# Patient Record
Sex: MALE | Race: WHITE | URBAN - METROPOLITAN AREA
[De-identification: names, ages, dates, MRNs, and addresses within clinical notes are randomized per-mention and may not be internally consistent; named-entity substitution may affect disease eponyms.]

---

## 2018-06-22 ENCOUNTER — OFFICE VISIT (OUTPATIENT)
Dept: URBAN - METROPOLITAN AREA CLINIC 18 | Facility: CLINIC | Age: 83
End: 2018-06-22
Payer: MEDICARE

## 2018-06-22 DIAGNOSIS — H40.1131 PRIMARY OPEN-ANGLE GLAUCOMA, BILATERAL, MILD STAGE: Primary | ICD-10-CM

## 2018-06-22 PROCEDURE — 92133 CPTRZD OPH DX IMG PST SGM ON: CPT | Performed by: OPTOMETRIST

## 2018-06-22 PROCEDURE — 99214 OFFICE O/P EST MOD 30 MIN: CPT | Performed by: OPTOMETRIST

## 2018-06-22 PROCEDURE — 92083 EXTENDED VISUAL FIELD XM: CPT | Performed by: OPTOMETRIST

## 2018-06-22 ASSESSMENT — INTRAOCULAR PRESSURE
OS: 12
OD: 12

## 2018-06-22 ASSESSMENT — KERATOMETRY
OS: 44.38
OD: 45.38

## 2018-06-22 NOTE — IMPRESSION/PLAN
Impression: Diagnosis: Primary open-angle glaucoma, bilateral, mild stage. Code: A25.6916. IOP stable OU Plan: Continue Travatan Z QHS OU daily OCT-81/76(83/79)(74/78)(83/74), VF OU borderline

## 2018-06-22 NOTE — IMPRESSION/PLAN
Impression: Diagnosis: Age-related nuclear cataract, bilateral. Code: H25.13. Plan: Cataracts account for the patient's complaints. No treatment currently recommended. The patient will monitor vision changes and contact us with any decrease in vision.

## 2018-07-20 ENCOUNTER — OFFICE VISIT (OUTPATIENT)
Dept: URBAN - METROPOLITAN AREA CLINIC 18 | Facility: CLINIC | Age: 83
End: 2018-07-20
Payer: COMMERCIAL

## 2018-07-20 PROCEDURE — 92012 INTRM OPH EXAM EST PATIENT: CPT | Performed by: OPTOMETRIST

## 2018-07-20 PROCEDURE — 92015 DETERMINE REFRACTIVE STATE: CPT | Performed by: OPTOMETRIST

## 2018-07-20 ASSESSMENT — VISUAL ACUITY
OS: 20/60
OD: 20/25

## 2018-07-20 ASSESSMENT — KERATOMETRY
OD: 44.25
OS: 46.25

## 2018-10-05 ENCOUNTER — OFFICE VISIT (OUTPATIENT)
Dept: URBAN - METROPOLITAN AREA CLINIC 18 | Facility: CLINIC | Age: 83
End: 2018-10-05
Payer: MEDICARE

## 2018-10-05 PROCEDURE — 99213 OFFICE O/P EST LOW 20 MIN: CPT | Performed by: OPTOMETRIST

## 2018-10-05 ASSESSMENT — INTRAOCULAR PRESSURE
OD: 15
OS: 11

## 2018-10-05 NOTE — IMPRESSION/PLAN
Impression: Central corneal opacity, left eye: H17.12. Plan: Discussed diagnosis in detail with patient. Advised patient of condition. Pt to cont P.A QID x 1 week then D/C. Cont using ATs daily at least QID OU.

## 2018-10-05 NOTE — IMPRESSION/PLAN
Impression: Diagnosis: Primary open-angle glaucoma, bilateral, mild stage. Code: W82.3247. stable IOP OU Plan: Continue Travatan Z QHS OU daily OCT-81/76(83/79)(74/78)(83/74), VF OU borderline

## 2019-01-28 ENCOUNTER — OFFICE VISIT (OUTPATIENT)
Dept: URBAN - METROPOLITAN AREA CLINIC 18 | Facility: CLINIC | Age: 84
End: 2019-01-28
Payer: MEDICARE

## 2019-01-28 PROCEDURE — 99213 OFFICE O/P EST LOW 20 MIN: CPT | Performed by: OPTOMETRIST

## 2019-01-28 ASSESSMENT — INTRAOCULAR PRESSURE
OS: 12
OD: 16

## 2019-01-28 ASSESSMENT — KERATOMETRY: OD: 44.38

## 2019-01-28 NOTE — IMPRESSION/PLAN
Impression: Diagnosis: Primary open-angle glaucoma, bilateral, mild stage. Code: F74.6297. IOP stable OU Plan: Continue Travatan Z QHS OU daily OCT-81/76(83/79)(74/78)(83/74), VF OU borderline

## 2019-05-06 ENCOUNTER — OFFICE VISIT (OUTPATIENT)
Dept: URBAN - METROPOLITAN AREA CLINIC 18 | Facility: CLINIC | Age: 84
End: 2019-05-06
Payer: MEDICARE

## 2019-05-06 DIAGNOSIS — H25.13 AGE-RELATED NUCLEAR CATARACT, BILATERAL: ICD-10-CM

## 2019-05-06 DIAGNOSIS — H17.12 CENTRAL CORNEAL OPACITY, LEFT EYE: ICD-10-CM

## 2019-05-06 PROCEDURE — 99213 OFFICE O/P EST LOW 20 MIN: CPT | Performed by: OPTOMETRIST

## 2019-05-06 ASSESSMENT — INTRAOCULAR PRESSURE
OS: 12
OD: 12

## 2019-05-06 NOTE — IMPRESSION/PLAN
Impression: Diagnosis: Primary open-angle glaucoma, bilateral, mild stage. Code: A35.0092.  stable IOP OU Plan: Continue Travatan Z QHS OU daily OCT-81/76(83/79)(74/78)(83/74), VF OU borderline

## 2019-07-23 ENCOUNTER — OFFICE VISIT (OUTPATIENT)
Dept: URBAN - METROPOLITAN AREA CLINIC 18 | Facility: CLINIC | Age: 84
End: 2019-07-23
Payer: MEDICARE

## 2019-07-23 PROCEDURE — 92012 INTRM OPH EXAM EST PATIENT: CPT | Performed by: OPTOMETRIST

## 2019-07-23 RX ORDER — TRIFLURIDINE 1 G/100ML
1 % SOLUTION OPHTHALMIC
Qty: 1 | Refills: 0 | Status: INACTIVE
Start: 2019-07-23 | End: 2019-08-01

## 2019-07-23 ASSESSMENT — INTRAOCULAR PRESSURE
OS: 12
OD: 14

## 2019-07-23 NOTE — IMPRESSION/PLAN
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 Plan: Discussed diagnosis in detail with patient. Advised patient of condition. Discussed treatment options with patient. Recommended to start Viroptic 1% (trifluridine 1%) drops 9x's a day into OS. Okay to use AT's in-between.

## 2019-07-29 ENCOUNTER — OFFICE VISIT (OUTPATIENT)
Dept: URBAN - METROPOLITAN AREA CLINIC 18 | Facility: CLINIC | Age: 84
End: 2019-07-29
Payer: MEDICARE

## 2019-07-29 PROCEDURE — 92012 INTRM OPH EXAM EST PATIENT: CPT | Performed by: OPTOMETRIST

## 2019-07-29 NOTE — IMPRESSION/PLAN
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 Plan: Continue Viroptic 1% (trifluridine 1%) drops 9x's a day into OS. Okay to use AT's in-between.

## 2019-08-05 ENCOUNTER — OFFICE VISIT (OUTPATIENT)
Dept: URBAN - METROPOLITAN AREA CLINIC 18 | Facility: CLINIC | Age: 84
End: 2019-08-05
Payer: MEDICARE

## 2019-08-05 DIAGNOSIS — B00.52 HERPESVIRAL KERATITIS: Primary | ICD-10-CM

## 2019-08-05 PROCEDURE — 99213 OFFICE O/P EST LOW 20 MIN: CPT | Performed by: OPTOMETRIST

## 2019-08-05 RX ORDER — PREDNISOLONE ACETATE 10 MG/ML
1 % SUSPENSION/ DROPS OPHTHALMIC
Qty: 1 | Refills: 0 | Status: INACTIVE
Start: 2019-08-05 | End: 2019-08-26

## 2019-08-05 RX ORDER — TRIFLURIDINE 1 G/100ML
1 % SOLUTION OPHTHALMIC
Qty: 7.5 | Refills: 1 | Status: INACTIVE
Start: 2019-08-05 | End: 2019-08-26

## 2019-08-05 ASSESSMENT — KERATOMETRY: OD: 44.75

## 2019-08-05 NOTE — IMPRESSION/PLAN
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 Plan: Discussed diagnosis with pt. Used Q tip and proparacaine to clean area. Continue Viroptic 1% (trifluridine 1%) drops 9x's a day into OS and start Pred ace BID OS (Rx sent). Okay to use AT's in-between.

## 2019-08-12 ENCOUNTER — OFFICE VISIT (OUTPATIENT)
Dept: URBAN - METROPOLITAN AREA CLINIC 18 | Facility: CLINIC | Age: 84
End: 2019-08-12
Payer: MEDICARE

## 2019-08-12 PROCEDURE — 92012 INTRM OPH EXAM EST PATIENT: CPT | Performed by: OPTOMETRIST

## 2019-08-12 ASSESSMENT — INTRAOCULAR PRESSURE
OS: 12
OD: 11

## 2019-08-12 NOTE — IMPRESSION/PLAN
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 Improved Plan: Reassured patient that light sensitivity is normal and will improve once condition resolves. Discontinue Prednisolone Acetate 1% at this time and Viroptic 1% (trifluridine 1%) drops 9x's a day into OS. Increase dosage of artifical tears into left eye. Okay to continue Travatan QHS OU. If condition worsens, patient is advised to call office.

## 2019-08-26 ENCOUNTER — OFFICE VISIT (OUTPATIENT)
Dept: URBAN - METROPOLITAN AREA CLINIC 18 | Facility: CLINIC | Age: 84
End: 2019-08-26
Payer: MEDICARE

## 2019-08-26 PROCEDURE — 92012 INTRM OPH EXAM EST PATIENT: CPT | Performed by: OPTOMETRIST

## 2019-08-26 RX ORDER — TRIFLURIDINE 1 G/100ML
1 % SOLUTION OPHTHALMIC
Qty: 1 | Refills: 1 | Status: INACTIVE
Start: 2019-08-26 | End: 2020-09-14

## 2019-08-26 ASSESSMENT — KERATOMETRY: OD: 44.25

## 2019-08-26 NOTE — IMPRESSION/PLAN
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 Improved but still can see faint ulcer Plan: Restart Viroptic 1% (trifluridine 1%) drops 5x's a day into OS. Use artifical tears in left eye. Okay to continue Travatan QHS OU. If condition worsens, patient is advised to call office.

## 2019-09-09 ENCOUNTER — OFFICE VISIT (OUTPATIENT)
Dept: URBAN - METROPOLITAN AREA CLINIC 18 | Facility: CLINIC | Age: 84
End: 2019-09-09
Payer: MEDICARE

## 2019-09-09 PROCEDURE — 92012 INTRM OPH EXAM EST PATIENT: CPT | Performed by: OPTOMETRIST

## 2019-09-09 ASSESSMENT — INTRAOCULAR PRESSURE: OS: 11

## 2019-09-09 NOTE — IMPRESSION/PLAN
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 Improved Plan: Continue Viroptic 1% (trifluridine 1%) drops 3x's a day into OS for 1 week then stop. Use artifical tears in left eye. Okay to continue Travatan QHS OU. If condition worsens, patient is advised to call office.

## 2019-09-23 ENCOUNTER — OFFICE VISIT (OUTPATIENT)
Dept: URBAN - METROPOLITAN AREA CLINIC 18 | Facility: CLINIC | Age: 84
End: 2019-09-23
Payer: MEDICARE

## 2019-09-23 PROCEDURE — 92012 INTRM OPH EXAM EST PATIENT: CPT | Performed by: OPTOMETRIST

## 2019-09-23 ASSESSMENT — KERATOMETRY: OD: 44.50

## 2019-09-23 NOTE — IMPRESSION/PLAN
Impression: Diagnosis: Primary open-angle glaucoma, bilateral, mild stage. Code: M06.6247. IOP stable OU Plan: Continue Travatan Z QHS OU daily OCT-81/76(83/79)(74/78)(83/74), VF OU borderline

## 2019-09-23 NOTE — IMPRESSION/PLAN
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 Improved Plan: Stop Viroptic 1% (trifluridine 1%) drops. Use artifical tears in left eye. Okay to continue Travatan QHS OU. If condition worsens, patient is advised to call office.

## 2019-12-16 ENCOUNTER — OFFICE VISIT (OUTPATIENT)
Dept: URBAN - METROPOLITAN AREA CLINIC 18 | Facility: CLINIC | Age: 84
End: 2019-12-16
Payer: MEDICARE

## 2019-12-16 PROCEDURE — 92133 CPTRZD OPH DX IMG PST SGM ON: CPT | Performed by: OPTOMETRIST

## 2019-12-16 PROCEDURE — 92083 EXTENDED VISUAL FIELD XM: CPT | Performed by: OPTOMETRIST

## 2019-12-16 PROCEDURE — 99214 OFFICE O/P EST MOD 30 MIN: CPT | Performed by: OPTOMETRIST

## 2019-12-16 ASSESSMENT — INTRAOCULAR PRESSURE
OS: 11
OS: 14
OD: 13
OD: 14

## 2019-12-16 NOTE — IMPRESSION/PLAN
Impression: Diagnosis: Primary open-angle glaucoma, bilateral, mild stage. Code: T22.9650. stable IOP OU Plan: Continue Travatan Z QHS OU daily OCT-77/63?(81/76)(83/79)(74/78)(83/74), VF OD sup arc misses OS sup arc misses BJW

## 2020-03-03 ENCOUNTER — OFFICE VISIT (OUTPATIENT)
Dept: URBAN - METROPOLITAN AREA CLINIC 18 | Facility: CLINIC | Age: 85
End: 2020-03-03
Payer: MEDICARE

## 2020-03-03 PROCEDURE — 99213 OFFICE O/P EST LOW 20 MIN: CPT | Performed by: OPTOMETRIST

## 2020-03-03 ASSESSMENT — INTRAOCULAR PRESSURE
OS: 12
OD: 12

## 2020-03-03 NOTE — IMPRESSION/PLAN
Impression: Diagnosis: Primary open-angle glaucoma, bilateral, mild stage. Code: Y65.2506. IOP stable OU Plan: Continue Travatan Z QHS OU daily OCT-77/63?(81/76)(83/79)(74/78)(83/74), VF OD sup arc misses OS sup arc misses BJW

## 2020-06-15 ENCOUNTER — OFFICE VISIT (OUTPATIENT)
Dept: URBAN - METROPOLITAN AREA CLINIC 18 | Facility: CLINIC | Age: 85
End: 2020-06-15
Payer: MEDICARE

## 2020-06-15 PROCEDURE — 99213 OFFICE O/P EST LOW 20 MIN: CPT | Performed by: OPTOMETRIST

## 2020-06-15 ASSESSMENT — INTRAOCULAR PRESSURE
OD: 14
OS: 12

## 2020-06-15 ASSESSMENT — KERATOMETRY: OD: 44.75

## 2020-06-15 NOTE — IMPRESSION/PLAN
Impression: Diagnosis: Primary open-angle glaucoma, bilateral, mild stage. Code: U70.6815. stable IOP OU Plan: Continue Travatan Z QHS OU daily OCT-77/63?(81/76)(83/79)(74/78)(83/74), VF OD sup arc misses OS sup arc misses BJW

## 2020-09-14 ENCOUNTER — OFFICE VISIT (OUTPATIENT)
Dept: URBAN - METROPOLITAN AREA CLINIC 18 | Facility: CLINIC | Age: 85
End: 2020-09-14
Payer: MEDICARE

## 2020-09-14 PROCEDURE — 99213 OFFICE O/P EST LOW 20 MIN: CPT | Performed by: OPTOMETRIST

## 2020-09-14 ASSESSMENT — INTRAOCULAR PRESSURE
OS: 14
OD: 14

## 2020-09-14 NOTE — IMPRESSION/PLAN
Impression: Diagnosis: Primary open-angle glaucoma, bilateral, mild stage. Code: J59.3394. stable IOP OU Plan: Continue Travatan Z QHS OU daily OCT-77/63?(81/76)(83/79)(74/78)(83/74), VF OD sup arc misses OS sup arc misses BJW

## 2021-04-13 ENCOUNTER — OFFICE VISIT (OUTPATIENT)
Dept: URBAN - METROPOLITAN AREA CLINIC 18 | Facility: CLINIC | Age: 86
End: 2021-04-13
Payer: MEDICARE

## 2021-04-13 DIAGNOSIS — H25.813 COMBINED FORMS OF AGE-RELATED CATARACT, BILATERAL: ICD-10-CM

## 2021-04-13 PROCEDURE — 99214 OFFICE O/P EST MOD 30 MIN: CPT | Performed by: OPTOMETRIST

## 2021-04-13 PROCEDURE — 92133 CPTRZD OPH DX IMG PST SGM ON: CPT | Performed by: OPTOMETRIST

## 2021-04-13 PROCEDURE — 92083 EXTENDED VISUAL FIELD XM: CPT | Performed by: OPTOMETRIST

## 2021-04-13 ASSESSMENT — INTRAOCULAR PRESSURE
OD: 13
OS: 11

## 2021-04-13 NOTE — IMPRESSION/PLAN
Impression: Diagnosis: Primary open-angle glaucoma, bilateral, mild stage. Code: I02.2073. Lower IOP OU  Plan: Continue Travatan Z QHS OU. OCT- 72?/?? (77/63? )(81/76)(83/79)(74/78)(83/74), VF- OD sup arc misses OS sup arc BJW

## 2021-04-13 NOTE — IMPRESSION/PLAN
Impression: Combined forms of age-related cataract, bilateral: H25.813. OS>OD Plan: Cataract accounts for patient's complaints. Patient education was discussed regarding cataracts, lens options and surgery. Recommend Cataract consultation with surgeon. Hold off filling any new glasses prescription until after consultation.

## 2021-05-07 ENCOUNTER — OFFICE VISIT (OUTPATIENT)
Dept: URBAN - METROPOLITAN AREA CLINIC 17 | Facility: CLINIC | Age: 86
End: 2021-05-07
Payer: MEDICARE

## 2021-05-07 DIAGNOSIS — H43.811 VITREOUS DEGENERATION, RIGHT EYE: ICD-10-CM

## 2021-05-07 PROCEDURE — 99204 OFFICE O/P NEW MOD 45 MIN: CPT | Performed by: OPHTHALMOLOGY

## 2021-05-07 ASSESSMENT — INTRAOCULAR PRESSURE
OD: 13
OS: 13

## 2021-05-07 ASSESSMENT — VISUAL ACUITY
OD: 20/30
OS: 20/60

## 2021-05-07 NOTE — IMPRESSION/PLAN
Impression: Combined forms of age-related cataract, bilateral: H25.813. Condition: established, worsening. Symptoms: could improve with surgery. Plan: Cataract accounts for patient's complaints. Reviewed risks, benefits, and procedure. Patient desires surgery, schedule combo ce/iol w/ istent inject OS then OD, RL2, discussed lens options, distance refractive target, patient is clear for surgery in [place] [address].

## 2021-05-07 NOTE — IMPRESSION/PLAN
Impression: Vitreous degeneration, right eye: H43.811. Plan: Discussed diagnosis in detail with patient. No treatment is required at this time. Will continue to observe condition and or symptoms.

## 2021-05-07 NOTE — IMPRESSION/PLAN
Impression: Diagnosis: Primary open-angle glaucoma, bilateral, mild stage. Code: Q59.4963. Lower IOP OU Plan: Discussed diagnosis in detail with patient. Advised Istent inject at the time of cataract surgery which could help with IOP management. Recommend combo CEIOL w/ Istent inject OS then OD Current therapy is best for patient. Continue Travatan Z QHS OU.

## 2021-05-13 ENCOUNTER — TESTING ONLY (OUTPATIENT)
Dept: URBAN - METROPOLITAN AREA CLINIC 17 | Facility: CLINIC | Age: 86
End: 2021-05-13
Payer: MEDICARE

## 2021-05-13 PROCEDURE — 92136 OPHTHALMIC BIOMETRY: CPT | Performed by: OPHTHALMOLOGY

## 2021-05-13 ASSESSMENT — PACHYMETRY
OD: 3.59
OD: 23.42
OS: 23.20
OS: 4.00

## 2021-05-20 ENCOUNTER — SURGERY (OUTPATIENT)
Dept: URBAN - METROPOLITAN AREA SURGERY 7 | Facility: SURGERY | Age: 86
End: 2021-05-20
Payer: MEDICARE

## 2021-05-20 PROCEDURE — 0191T INSERTION OF ANTERIOR SEGMENT AQUEOUS DRAINAGE DEVICE, W/OUT EXTRAOCULAR RESERVO: CPT | Performed by: OPHTHALMOLOGY

## 2021-05-20 PROCEDURE — 66984 XCAPSL CTRC RMVL W/O ECP: CPT | Performed by: OPHTHALMOLOGY

## 2021-05-21 ENCOUNTER — POST-OPERATIVE VISIT (OUTPATIENT)
Dept: URBAN - METROPOLITAN AREA CLINIC 18 | Facility: CLINIC | Age: 86
End: 2021-05-21
Payer: MEDICARE

## 2021-05-21 PROCEDURE — 99024 POSTOP FOLLOW-UP VISIT: CPT | Performed by: STUDENT IN AN ORGANIZED HEALTH CARE EDUCATION/TRAINING PROGRAM

## 2021-05-21 ASSESSMENT — INTRAOCULAR PRESSURE
OD: 13
OS: 14

## 2021-05-21 NOTE — IMPRESSION/PLAN
Impression: S/P Cataract Extraction by phacoemulsification with IOL placement [id number]; Shunt:  iStent Inject 0191T, 0376T OS - 1 Day. Encounter for surgical aftercare following surgery on a sense organ  Z48.810. Post operative instructions reviewed - Plan: RTC immediately if any sudden changes to vision or pain. --Advised patient to use artificial tears for comfort.

## 2021-05-28 ENCOUNTER — POST-OPERATIVE VISIT (OUTPATIENT)
Dept: URBAN - METROPOLITAN AREA CLINIC 17 | Facility: CLINIC | Age: 86
End: 2021-05-28
Payer: MEDICARE

## 2021-05-28 DIAGNOSIS — Z48.810 ENCOUNTER FOR SURGICAL AFTERCARE FOLLOWING SURGERY ON A SENSE ORGAN: Primary | ICD-10-CM

## 2021-05-28 PROCEDURE — 99024 POSTOP FOLLOW-UP VISIT: CPT | Performed by: OPTOMETRIST

## 2021-05-28 ASSESSMENT — INTRAOCULAR PRESSURE
OS: 10
OD: 14

## 2021-05-28 ASSESSMENT — VISUAL ACUITY: OS: 20/70-1

## 2021-06-03 ENCOUNTER — SURGERY (OUTPATIENT)
Dept: URBAN - METROPOLITAN AREA SURGERY 7 | Facility: SURGERY | Age: 86
End: 2021-06-03
Payer: MEDICARE

## 2021-06-03 PROCEDURE — 66984 XCAPSL CTRC RMVL W/O ECP: CPT | Performed by: OPHTHALMOLOGY

## 2021-06-03 PROCEDURE — 0191T INSERTION OF ANTERIOR SEGMENT AQUEOUS DRAINAGE DEVICE, W/OUT EXTRAOCULAR RESERVO: CPT | Performed by: OPHTHALMOLOGY

## 2021-06-04 ENCOUNTER — POST-OPERATIVE VISIT (OUTPATIENT)
Dept: URBAN - METROPOLITAN AREA CLINIC 18 | Facility: CLINIC | Age: 86
End: 2021-06-04
Payer: MEDICARE

## 2021-06-04 ENCOUNTER — POST-OPERATIVE VISIT (OUTPATIENT)
Dept: URBAN - METROPOLITAN AREA CLINIC 17 | Facility: CLINIC | Age: 86
End: 2021-06-04
Payer: MEDICARE

## 2021-06-04 PROCEDURE — 99024 POSTOP FOLLOW-UP VISIT: CPT | Performed by: OPHTHALMOLOGY

## 2021-06-04 PROCEDURE — 99024 POSTOP FOLLOW-UP VISIT: CPT | Performed by: STUDENT IN AN ORGANIZED HEALTH CARE EDUCATION/TRAINING PROGRAM

## 2021-06-04 ASSESSMENT — INTRAOCULAR PRESSURE
OS: 15
OS: 15
OD: 24
OD: 9
OD: 28
OD: 9
OD: 52
OD: 52
OD: 24
OD: 28

## 2021-06-04 NOTE — IMPRESSION/PLAN
Impression: S/P Cataract Extraction by phacoemulsification with IOL placement [id number]; iStent Inject 0191T 0376T OD - 1 Day. Presence of intraocular lens  Z96.1. Post operative instructions reviewed - Plan: Pt reports headache/nausea. IOP elevated 52. Difficulty burping wound. Instilled gtt alphagan (no other drops available). Refer to [facility] today with Dr. Author Bethea to reduce IOP.

## 2021-06-04 NOTE — IMPRESSION/PLAN
Impression: S/P Cataract Extraction by phacoemulsification with IOL placement [id number]; iStent Inject 0191T 0376T OD - 1 Day. Encounter for surgical aftercare following surgery on a sense organ  Z48.810. Plan: IOP elevated OD, burped th wound today under slit lamp and IOP now under 10. Will let pt sit for a few mins then check IOP again in 20-30 mins. IOP now 24 OD.  Recommend starting Combigan BID OD today (Sample given)

## 2021-06-11 ENCOUNTER — POST-OPERATIVE VISIT (OUTPATIENT)
Dept: URBAN - METROPOLITAN AREA CLINIC 17 | Facility: CLINIC | Age: 86
End: 2021-06-11
Payer: MEDICARE

## 2021-06-11 DIAGNOSIS — Z96.1 PRESENCE OF INTRAOCULAR LENS: Primary | ICD-10-CM

## 2021-06-11 PROCEDURE — 99024 POSTOP FOLLOW-UP VISIT: CPT | Performed by: OPTOMETRIST

## 2021-06-11 ASSESSMENT — INTRAOCULAR PRESSURE
OS: 13
OD: 9

## 2021-06-11 ASSESSMENT — VISUAL ACUITY
OD: 20/50-2
OS: 20/150-

## 2021-06-11 NOTE — IMPRESSION/PLAN
Impression: S/P Cataract Extraction by phacoemulsification with IOL placement [id number]; iStent Inject 0191T 0376T OD - 8 Days. Presence of intraocular lens  Z96.1. Plan: Keep appt on 7/13/21 with Dr. Hay Austin 
--Britt Lewis
--Stay Off Travatan
--Advised patient to use artificial tears for comfort, AT QID and gel QHS OU.

## 2021-07-13 ENCOUNTER — OFFICE VISIT (OUTPATIENT)
Dept: URBAN - METROPOLITAN AREA CLINIC 18 | Facility: CLINIC | Age: 86
End: 2021-07-13
Payer: MEDICARE

## 2021-07-13 PROCEDURE — 99212 OFFICE O/P EST SF 10 MIN: CPT | Performed by: OPTOMETRIST

## 2021-07-13 ASSESSMENT — INTRAOCULAR PRESSURE
OS: 16
OD: 18

## 2021-07-13 NOTE — IMPRESSION/PLAN
Impression: Primary open-angle glaucoma, mild stage, bilateral: H40.1131. Plan: IOPs stable without drops. Recheck in 3 months.

## 2021-07-29 ENCOUNTER — OFFICE VISIT (OUTPATIENT)
Dept: URBAN - METROPOLITAN AREA CLINIC 18 | Facility: CLINIC | Age: 86
End: 2021-07-29
Payer: COMMERCIAL

## 2021-07-29 DIAGNOSIS — H52.03 HYPERMETROPIA, BILATERAL: Primary | ICD-10-CM

## 2021-07-29 PROCEDURE — 92012 INTRM OPH EXAM EST PATIENT: CPT | Performed by: OPTOMETRIST

## 2021-07-29 ASSESSMENT — VISUAL ACUITY
OS: 20/80
OD: 20/30

## 2021-07-29 NOTE — IMPRESSION/PLAN
Impression: Hypermetropia, bilateral: H52.03. Plan: Finalized New Glasses RX. Patient education on appropriate options of eye glasses. Return to clinic in one year for complete eye exam and refraction.

## 2021-11-22 ENCOUNTER — OFFICE VISIT (OUTPATIENT)
Dept: URBAN - METROPOLITAN AREA CLINIC 18 | Facility: CLINIC | Age: 86
End: 2021-11-22
Payer: MEDICARE

## 2021-11-22 DIAGNOSIS — H02.834 DERMATOCHALASIS OF LEFT UPPER LID: ICD-10-CM

## 2021-11-22 PROCEDURE — 99213 OFFICE O/P EST LOW 20 MIN: CPT | Performed by: OPTOMETRIST

## 2021-11-22 ASSESSMENT — INTRAOCULAR PRESSURE
OS: 14
OD: 16

## 2021-11-22 NOTE — IMPRESSION/PLAN
Impression: Central corneal opacity, left eye: H17.12. Plan: Discussed diagnosis in detail with patient. No treatment is required at this time.
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 Plan: Discussed diagnosis in detail with patient. No treatment is required at this time. Will continue to observe condition and or symptoms. Patient instructed to call if condition gets worse.
Impression: Primary open-angle glaucoma, mild stage, bilateral: H40.1131. Plan: IOP stable, no current Tx - continue to monitor IOP.
Home

## 2022-06-22 ENCOUNTER — OFFICE VISIT (OUTPATIENT)
Dept: URBAN - METROPOLITAN AREA CLINIC 18 | Facility: CLINIC | Age: 87
End: 2022-06-22
Payer: MEDICARE

## 2022-06-22 DIAGNOSIS — H17.12 CENTRAL CORNEAL OPACITY, LEFT EYE: ICD-10-CM

## 2022-06-22 DIAGNOSIS — H43.811 VITREOUS DEGENERATION, RIGHT EYE: ICD-10-CM

## 2022-06-22 DIAGNOSIS — H40.1131 PRIMARY OPEN-ANGLE GLAUCOMA, MILD STAGE, BILATERAL: Primary | ICD-10-CM

## 2022-06-22 PROCEDURE — 92014 COMPRE OPH EXAM EST PT 1/>: CPT | Performed by: OPTOMETRIST

## 2022-06-22 ASSESSMENT — INTRAOCULAR PRESSURE
OS: 14
OD: 13

## 2022-06-22 NOTE — IMPRESSION/PLAN
Impression: Primary open-angle glaucoma, mild stage, bilateral: H40.1131. Plan: Borderline glaucoma, intraocular pressure stable without medication. Continue without medication and observe.

## 2022-06-22 NOTE — IMPRESSION/PLAN
Impression: Central corneal opacity, left eye: H17.12. Plan: Discussed diagnosis in detail with patient. No treatment is required at this time. Will continue to observe condition and or symptoms. Patient instructed to call if condition gets worse.  Order Refraction per patient request.

## 2022-06-30 ENCOUNTER — OFFICE VISIT (OUTPATIENT)
Dept: URBAN - METROPOLITAN AREA CLINIC 18 | Facility: CLINIC | Age: 87
End: 2022-06-30

## 2022-06-30 DIAGNOSIS — H52.03 HYPERMETROPIA, BILATERAL: Primary | ICD-10-CM

## 2022-06-30 ASSESSMENT — VISUAL ACUITY
OS: 20/200
OD: 20/30

## 2023-05-15 ENCOUNTER — OFFICE VISIT (OUTPATIENT)
Dept: URBAN - METROPOLITAN AREA CLINIC 18 | Facility: CLINIC | Age: 88
End: 2023-05-15
Payer: MEDICARE

## 2023-05-15 DIAGNOSIS — H43.811 VITREOUS DEGENERATION, RIGHT EYE: Primary | ICD-10-CM

## 2023-05-15 DIAGNOSIS — Z96.1 PRESENCE OF INTRAOCULAR LENS: ICD-10-CM

## 2023-05-15 PROCEDURE — 99213 OFFICE O/P EST LOW 20 MIN: CPT | Performed by: OPTOMETRIST

## 2023-05-15 ASSESSMENT — INTRAOCULAR PRESSURE
OS: 18
OD: 18

## 2023-05-15 NOTE — IMPRESSION/PLAN
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 Plan: Discussed diagnosis in detail with patient. No treatment is required at this time. Will continue to observe condition and or symptoms. Patient instructed to call if condition gets worse.  Order Refraction per patient request.

## 2023-05-15 NOTE — IMPRESSION/PLAN
Impression: Vitreous degeneration, right eye: H43.811. Plan: Posterior vitreous detachment accounts for the patient's complaints. There is no evidence of retinal pathology. All signs and risks of retinal detachment and tears were discussed in detail. Patient instructed to call the office immediately if flashes and floaters increase dramatically.

## 2023-05-16 ENCOUNTER — TESTING ONLY (OUTPATIENT)
Dept: URBAN - METROPOLITAN AREA CLINIC 18 | Facility: CLINIC | Age: 88
End: 2023-05-16

## 2023-05-16 DIAGNOSIS — H02.834 DERMATOCHALASIS OF LEFT UPPER LID: ICD-10-CM

## 2023-05-16 DIAGNOSIS — H52.03 HYPERMETROPIA, BILATERAL: Primary | ICD-10-CM

## 2023-05-16 ASSESSMENT — VISUAL ACUITY
OD: 20/30
OS: 20/100

## 2023-05-16 ASSESSMENT — INTRAOCULAR PRESSURE
OD: 8
OS: 9

## 2023-05-16 NOTE — IMPRESSION/PLAN
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 Plan: Discussed diagnosis in detail with patient. Advised patient of condition. Consult recommended [[facility]Plastic Surgeon].

## 2024-07-15 ENCOUNTER — OFFICE VISIT (OUTPATIENT)
Dept: URBAN - METROPOLITAN AREA CLINIC 18 | Facility: CLINIC | Age: 89
End: 2024-07-15

## 2024-07-15 DIAGNOSIS — H52.4 PRESBYOPIA: Primary | ICD-10-CM

## 2024-07-15 DIAGNOSIS — H52.03 HYPERMETROPIA, BILATERAL: ICD-10-CM

## 2024-07-15 ASSESSMENT — VISUAL ACUITY
OS: 20/100
OD: 20/30